# Patient Record
Sex: MALE | Race: WHITE | Employment: OTHER | URBAN - METROPOLITAN AREA
[De-identification: names, ages, dates, MRNs, and addresses within clinical notes are randomized per-mention and may not be internally consistent; named-entity substitution may affect disease eponyms.]

---

## 2018-03-25 ENCOUNTER — HOSPITAL ENCOUNTER (OUTPATIENT)
Age: 58
Discharge: HOME | End: 2018-03-25
Attending: PEDIATRICS
Payer: COMMERCIAL

## 2018-03-25 VITALS
RESPIRATION RATE: 20 BRPM | SYSTOLIC BLOOD PRESSURE: 136 MMHG | HEIGHT: 65 IN | BODY MASS INDEX: 24.16 KG/M2 | DIASTOLIC BLOOD PRESSURE: 82 MMHG | WEIGHT: 145 LBS | HEART RATE: 62 BPM | TEMPERATURE: 98 F

## 2018-03-25 DIAGNOSIS — T14.8XXA FOREIGN BODY IN SKIN: Primary | ICD-10-CM

## 2018-03-25 PROCEDURE — 99203 OFFICE O/P NEW LOW 30 MIN: CPT

## 2018-03-25 RX ORDER — AMLODIPINE BESYLATE 10 MG/1
10 TABLET ORAL DAILY
COMMUNITY

## 2018-03-25 RX ORDER — ASPIRIN 325 MG
325 TABLET ORAL DAILY
COMMUNITY

## 2018-03-25 RX ORDER — CEPHALEXIN 500 MG/1
500 CAPSULE ORAL 3 TIMES DAILY
Qty: 21 CAPSULE | Refills: 0 | Status: SHIPPED | OUTPATIENT
Start: 2018-03-25 | End: 2018-04-01

## 2018-03-25 NOTE — ED PROVIDER NOTES
Patient presents with:  Bite Sting,Insect (integumentary)    Stated Complaint: bite on back    HPI  Patient complains of skin rash for  3-4 days. Located L flank area. Describes as bothersome. Possible exposures include:  None, thinks maybe bug bit him. body in skin  (primary encounter diagnosis)     Area was cleaned with alcohol swabs and the foreign body was extracted with some difficulty using a splinter removal forcep.   I stop numerous times asking if he was okay or wanted the area numbed and he decli

## 2018-03-25 NOTE — ED INITIAL ASSESSMENT (HPI)
Patient states having a bite on his back, possible a spider or tick bite, since Wednesday. Patient denies drainage to affected area, increase in size of affected area, fever.   Patient denies recent ravel, stats he has spent a lot of time in the woods, hik

## 2019-04-29 ENCOUNTER — HOSPITAL (OUTPATIENT)
Dept: OTHER | Age: 59
End: 2019-04-29
Attending: INTERNAL MEDICINE